# Patient Record
Sex: FEMALE | Race: BLACK OR AFRICAN AMERICAN | Employment: FULL TIME | URBAN - METROPOLITAN AREA
[De-identification: names, ages, dates, MRNs, and addresses within clinical notes are randomized per-mention and may not be internally consistent; named-entity substitution may affect disease eponyms.]

---

## 2017-08-13 ENCOUNTER — HOSPITAL ENCOUNTER (EMERGENCY)
Facility: CLINIC | Age: 32
Discharge: HOME | End: 2017-08-13
Attending: EMERGENCY MEDICINE | Admitting: EMERGENCY MEDICINE
Payer: COMMERCIAL

## 2017-08-13 VITALS
HEIGHT: 63 IN | TEMPERATURE: 98 F | RESPIRATION RATE: 16 BRPM | BODY MASS INDEX: 30.12 KG/M2 | WEIGHT: 170 LBS | OXYGEN SATURATION: 98 % | SYSTOLIC BLOOD PRESSURE: 137 MMHG | DIASTOLIC BLOOD PRESSURE: 82 MMHG

## 2017-08-13 DIAGNOSIS — Z20.2 EXPOSURE TO SYPHILIS: ICD-10-CM

## 2017-08-13 PROCEDURE — 96372 THER/PROPH/DIAG INJ SC/IM: CPT

## 2017-08-13 PROCEDURE — 25000128 H RX IP 250 OP 636: Performed by: EMERGENCY MEDICINE

## 2017-08-13 PROCEDURE — 99284 EMERGENCY DEPT VISIT MOD MDM: CPT | Mod: 25

## 2017-08-13 RX ADMIN — PENICILLIN G BENZATHINE 2.4 MILLION UNITS: 2400000 INJECTION, SUSPENSION INTRAMUSCULAR at 16:11

## 2017-08-13 NOTE — ED AVS SNAPSHOT
Emergency Department    [address]    [place] [address]    Phone:  [phone]    Fax:  [phone]                                       Estela Ye   MRN: [id number]    Department:   Emergency Department   Date of Visit:  8/13/2017           Patient Information     Date Of Birth          1985        Your diagnoses for this visit were:     Exposure to syphilis        You were seen by Archie Mott MD.      Follow-up Information     Follow up with [facility] In 1 week.    Why:  As needed    Contact information:    [facility], [address]  [address] [place] [address]          Discharge Instructions       We have treated you for syphilis, due to your history of being exposed.    We have not tested  You for other  Sexually transmitted disease. If you develop a fever, or vaginal discharge or new sympotms pleae follow up with your obgyn or return to the ER.      Discharge References/Attachments     STD, IF YOU THINK YOU HAVE (ENGLISH)    SEXUALLY TRANSMITTED DISEASES (STDS), WHAT ARE (ENGLISH)      24 Hour Appointment Hotline       To [facility] an appointment [facility], call [phone] ([phone]). If you don't have a family doctor or clinic, we will help you find one. [facility] clinics are conveniently located to serve the needs of you and your family.             Review of your medicines      Notice     You have not been prescribed any medications.            Orders Needing Specimen Collection     None      Pending Results     No orders found from 8/11/2017 to 8/14/2017.            Pending Culture Results     No orders found from 8/11/2017 to 8/14/2017.            Pending Results Instructions     If you had any lab results that were not finalized at the time of your Discharge, you can call the ED Lab Result RN at [phone]. You will be contacted by this team for any positive Lab results or changes in treatment. The nurses  are available 7 days a week from 10A to 6:30P.  You can leave a message 24 hours per day and they will return your call.        Test Results From Your Hospital Stay               Clinical Quality Measure: Blood Pressure Screening     Your blood pressure was checked while you were in the emergency department today. The last reading we obtained was  BP: 137/82 . Please read the guidelines below about what these numbers mean and what you should do about them.  If your systolic blood pressure (the top number) is less than 120 and your diastolic blood pressure (the bottom number) is less than 80, then your blood pressure is normal. There is nothing more that you need to do about it.  If your systolic blood pressure (the top number) is 120-139 or your diastolic blood pressure (the bottom number) is 80-89, your blood pressure may be higher than it should be. You should have your blood pressure rechecked within a year by a primary care provider.  If your systolic blood pressure (the top number) is 140 or greater or your diastolic blood pressure (the bottom number) is 90 or greater, you may have high blood pressure. High blood pressure is treatable, but if left untreated over time it can put you at risk for heart attack, stroke, or kidney failure. You should have your blood pressure rechecked by a primary care provider within the next 4 weeks.  If your provider in the emergency department today gave you specific instructions to follow-up with your doctor or provider even sooner than that, you should follow that instruction and not wait for up to 4 weeks for your follow-up visit.        Thank you for choosing [facility]       Thank you for choosing [facility] for your care. Our goal is always to provide you with excellent care. Hearing back from our patients is one way we can continue to improve our services. Please take a few minutes to complete the written survey that you may receive in the mail after you visit with us. Thank  "you!        [employer] Information     divorce360 lets you send messages to your doctor, view your test results, renew your prescriptions, schedule appointments and more. To sign up, go to [email or web address] . Click on \"Log in\" on the left side of the screen, which will take you to the Welcome page. Then click on \"Sign up Now\" on the right side of the page.     You will be asked to enter the access code listed below, as well as some personal information. Please follow the directions to create your username and password.     Your access code is: [email or web address]  Expires: 2017  8:13 PM     Your access code will  in 90 days. If you need help or a new code, please call your [place] clinic or [phone].        Care EveryWhere ID     This is your Care EveryWhere ID. This could be used by other organizations to access your [place] medical records  [id number]        Equal Access to Services     [facility] Hospital[facility]ISIO : Hadii january salaso Somadeline, waaxda luqadaha, qaybta kaalmada adearvin, sadia whitehead . So [facility] [phone].    ATENCIÓN: Si habla español, tiene a pope disposición servicios gratuitos de asistencia lingüística. Llame al [phone].    We comply with applicable federal civil rights laws and [place] laws. We do not discriminate on the basis of race, color, national origin, age, disability sex, sexual orientation or gender identity.            After Visit Summary       This is your record. Keep this with you and show to your community pharmacist(s) and doctor(s) at your next visit.                  "

## 2017-08-13 NOTE — ED PROVIDER NOTES
"  History     Chief Complaint:  Exposure to STD      The history is provided by the patient.      Estela Ye is a 32 year old female who presents with exposure to STD. The patient was called by a romantic partner this morning and told that he tested positive for syphilis. The last time they had intercourse was around 6/2/17. After receiving the call she reports noticing some vaginal discharge, prompting her visit to the emergency department. She has no other medical concerns.    Allergies:  No known drug allergies      Medications:    The patient is not currently taking any prescribed medications.    Past Medical History:    The patient does not have any past pertinent medical history.     Past Surgical History:    History reviewed. No pertinent surgical history.     Family History:    History reviewed. No pertinent family history.      Social History:  Presents with infant   Tobacco use: Not on file  Alcohol use: No  PCP: [facility]    Marital Status:  Single      Review of Systems   Genitourinary: Positive for vaginal discharge.     Physical Exam     Patient Vitals for the past 24 hrs:   BP Temp Temp src Heart Rate Resp SpO2 Height Weight   08/13/17 1511 137/82 98  F (36.7  C) Oral 86 16 98 % 1.6 m (5' 3\") 77.1 kg (170 lb)      Physical Exam   Constitutional: She is oriented to person, place, and time. She appears well-developed.   Cardiovascular: Normal rate.    Pulmonary/Chest: Effort normal.   Musculoskeletal: Normal range of motion.   Neurological: She is alert and oriented to person, place, and time.   Vitals reviewed.      Emergency Department Course   Interventions:  1611: Bicillin 2.4 Million Units Intramuscular    Emergency Department Course:  Past medical records, nursing notes, and vitals reviewed.  1535: I performed an exam of the patient and obtained history, as documented above.      1601: I rechecked the patient. Findings and plan explained to the Patient. Patient " discharged home with instructions regarding supportive care, medications, and reasons to return. The importance of close follow-up was reviewed.    Impression & Plan    Medical Decision Making:  Estela Ye is a 32 year old female presenting with concerns for STD exposure. Patient admits to a sexual partner who had test proven syphilis according to her. The patient has had no symptoms. She was offered testing and treatment. We did discuss gonorrhea and chlamydia. Patient says she has been tested for this recently and it was negative. Patient's goals are to get treated for syphilis. Patient was given a one time treatment of penicillin and discharged home.      Diagnosis:    ICD-10-CM   1. Exposure to syphilis Z20.2       Disposition:  Discharged to home with plan as outlined above.      Kulwant Quispe  8/13/2017    EMERGENCY DEPARTMENT  I, Kulwant Quispe, am serving as a scribe at 3:35 PM on 8/13/2017 to document services personally performed by Archie Mott MD based on my observations and the provider's statements to me.       Archie Mott MD  08/18/17 1104

## 2017-08-13 NOTE — DISCHARGE INSTRUCTIONS
We have treated you for syphilis, due to your history of being exposed.    We have not tested  You for other  Sexually transmitted disease. If you develop a fever, or vaginal discharge or new sympotms pleae follow up with your obgyn or return to the ER.

## 2017-08-13 NOTE — ED AVS SNAPSHOT
Emergency Department    [address] [facility] [address]    Phone:  [phone]    Fax:  [phone]                                       Estela Ye   MRN: [id number]    Department:   Emergency Department   Date of Visit:  8/13/2017           After Visit Summary Signature Page     I have received my discharge instructions, and my questions have been answered. I have discussed any challenges I see with this plan with the nurse or doctor.    ..........................................................................................................................................  Patient/Patient Representative Signature      ..........................................................................................................................................  Patient Representative Print Name and Relationship to Patient    ..................................................               ................................................  Date                                            Time    ..........................................................................................................................................  Reviewed by Signature/Title    ...................................................              ..............................................  Date                                                            Time

## 2018-08-13 ENCOUNTER — HOSPITAL ENCOUNTER (EMERGENCY)
Facility: CLINIC | Age: 33
Discharge: HOME | End: 2018-08-13
Admitting: PHYSICIAN ASSISTANT
Payer: COMMERCIAL

## 2018-08-13 VITALS
HEART RATE: 89 BPM | TEMPERATURE: 98.6 F | SYSTOLIC BLOOD PRESSURE: 132 MMHG | WEIGHT: 175 LBS | BODY MASS INDEX: 31.01 KG/M2 | OXYGEN SATURATION: 100 % | DIASTOLIC BLOOD PRESSURE: 87 MMHG | RESPIRATION RATE: 14 BRPM | HEIGHT: 63 IN

## 2018-08-13 DIAGNOSIS — N89.8 VAGINAL IRRITATION: ICD-10-CM

## 2018-08-13 DIAGNOSIS — N89.8 VAGINAL SORE: ICD-10-CM

## 2018-08-13 LAB
SPECIMEN SOURCE: NORMAL
WET PREP SPEC: NORMAL

## 2018-08-13 PROCEDURE — 87210 SMEAR WET MOUNT SALINE/INK: CPT | Performed by: PHYSICIAN ASSISTANT

## 2018-08-13 PROCEDURE — 87529 HSV DNA AMP PROBE: CPT | Performed by: PHYSICIAN ASSISTANT

## 2018-08-13 PROCEDURE — 87591 N.GONORRHOEAE DNA AMP PROB: CPT | Performed by: PHYSICIAN ASSISTANT

## 2018-08-13 PROCEDURE — 87491 CHLMYD TRACH DNA AMP PROBE: CPT | Performed by: PHYSICIAN ASSISTANT

## 2018-08-13 PROCEDURE — 99283 EMERGENCY DEPT VISIT LOW MDM: CPT

## 2018-08-13 ASSESSMENT — ENCOUNTER SYMPTOMS
FEVER: 0
HEMATURIA: 0
DYSURIA: 0
ABDOMINAL PAIN: 0

## 2018-08-13 NOTE — ED AVS SNAPSHOT
Emergency Department    [address] [facility] [address]    Phone:  [phone]    Fax:  [phone]                                       Estela Ye   MRN: [id number]    Department:   Emergency Department   Date of Visit:  8/13/2018           Patient Information     Date Of Birth          1985        Your diagnoses for this visit were:     Vaginal irritation     Vaginal sore       Follow-up Information     Go to  Emergency Department.    Specialty:  EMERGENCY MEDICINE    Why:  worsening/changing pain, abdominal pain, fevers >101F, new concerns.     Contact information:    [address] [facility] [address] [phone]        Follow up with [facility]. Go in 1 week.    Why:  For recheck     Contact information:    [address] [address] [phone]          Discharge Instructions       Use bacitracin to the lesion for the next few days. You will be called if the herpes test is positive. Follow-up with your primary care provider within the next week for recheck of symptoms.  Return to the ED for worsening/changing pain, abdominal pain, fevers >101F, new concerns.     24 Hour Appointment Hotline       To make an appointment at any [facility], call [phone] ([phone]). If you don't have a family doctor or clinic, we will help you find one. [place] clinics are conveniently located to serve the needs of you and your family.             Review of your medicines      Notice     You have not been prescribed any medications.            Procedures and tests performed during your visit     Chlamydia trachomatis PCR    HSV 1 and 2 DNA by PCR    Neisseria gonorrhoea PCR    Prep for procedure - pelvic exam    Wet prep      Orders Needing Specimen Collection     None      Pending Results     Date and Time Order Name Status Description    8/13/2018 2136 HSV 1 and 2 DNA by PCR In process     8/13/2018 2043  Neisseria gonorrhoea PCR In process     8/13/2018 2043 Chlamydia trachomatis PCR In process             Pending Culture Results     Date and Time Order Name Status Description    8/13/2018 2136 HSV 1 and 2 DNA by PCR In process     8/13/2018 2043 Neisseria gonorrhoea PCR In process     8/13/2018 2043 Chlamydia trachomatis PCR In process             Pending Results Instructions     If you had any lab results that were not finalized at the time of your Discharge, you can call the ED Lab Result RN at [phone]. You will be contacted by this team for any positive Lab results or changes in treatment. The nurses are available 7 days a week from 10A to 6:30P.  You can leave a message 24 hours per day and they will return your call.        Test Results From Your Hospital Stay        8/13/2018  9:35 PM      Component Results     Component    Specimen Description    Vagina    Wet Prep    No Trichomonas seen    Wet Prep    No yeast seen    Wet Prep    No clue cells seen    Wet Prep    No PMNs seen         8/13/2018  9:30 PM         8/13/2018  9:30 PM         8/13/2018  9:40 PM                Clinical Quality Measure: Blood Pressure Screening     Your blood pressure was checked while you were in the emergency department today. The last reading we obtained was  BP: 132/87 . Please read the guidelines below about what these numbers mean and what you should do about them.  If your systolic blood pressure (the top number) is less than 120 and your diastolic blood pressure (the bottom number) is less than 80, then your blood pressure is normal. There is nothing more that you need to do about it.  If your systolic blood pressure (the top number) is 120-139 or your diastolic blood pressure (the bottom number) is 80-89, your blood pressure may be higher than it should be. You should have your blood pressure rechecked within a year by a primary care provider.  If your systolic blood pressure (the top number) is 140 or greater or your  "diastolic blood pressure (the bottom number) is 90 or greater, you may have high blood pressure. High blood pressure is treatable, but if left untreated over time it can put you at risk for heart attack, stroke, or kidney failure. You should have your blood pressure rechecked by a primary care provider within the next 4 weeks.  If your provider in the emergency department today gave you specific instructions to follow-up with your doctor or provider even sooner than that, you should follow that instruction and not wait for up to 4 weeks for your follow-up visit.        Thank you for choosing [facility]       Thank you for choosing [facility] for your care. Our goal is always to provide you with excellent care. Hearing back from our patients is one way we can continue to improve our services. Please take a few minutes to complete the written survey that you may receive in the mail after you visit with us. Thank you!        [employer] Information     mSpot lets you send messages to your doctor, view your test results, renew your prescriptions, schedule appointments and more. To sign up, go to [email or web address] . Click on \"Log in\" on the left side of the screen, which will take you to the Welcome page. Then click on \"Sign up Now\" on the right side of the page.     You will be asked to enter the access code listed below, as well as some personal information. Please follow the directions to create your username and password.     Your access code is: [email or web address]  Expires: 2018 10:07 PM     Your access code will  in 90 days. If you need help or a new code, please call your [place] clinic or [phone].        Care EveryWhere ID     This is your Care EveryWhere ID. This could be used by other organizations to access your [place] medical records  [id number]        Equal Access to Services     YOHAN CANNON AH: sandra Hartman, sadia keller " francisco whitehead ah. So [facility] [phone].    ATENCIÓN: Si habla español, tiene a pope disposición servicios gratuitos de asistencia lingüística. Llame al [phone].    We comply with applicable federal civil rights laws and [place] laws. We do not discriminate on the basis of race, color, national origin, age, disability, sex, sexual orientation, or gender identity.            After Visit Summary       This is your record. Keep this with you and show to your community pharmacist(s) and doctor(s) at your next visit.

## 2018-08-13 NOTE — ED AVS SNAPSHOT
Emergency Department    [address] [facility] [address]    Phone:  [phone]    Fax:  [phone]                                       Estela Ye   MRN: [id number]    Department:   Emergency Department   Date of Visit:  8/13/2018           After Visit Summary Signature Page     I have received my discharge instructions, and my questions have been answered. I have discussed any challenges I see with this plan with the nurse or doctor.    ..........................................................................................................................................  Patient/Patient Representative Signature      ..........................................................................................................................................  Patient Representative Print Name and Relationship to Patient    ..................................................               ................................................  Date                                            Time    ..........................................................................................................................................  Reviewed by Signature/Title    ...................................................              ..............................................  Date                                                            Time

## 2018-08-14 ENCOUNTER — TELEPHONE (OUTPATIENT)
Dept: EMERGENCY MEDICINE | Facility: CLINIC | Age: 33
End: 2018-08-14

## 2018-08-14 DIAGNOSIS — B00.9 HSV (HERPES SIMPLEX VIRUS) INFECTION: ICD-10-CM

## 2018-08-14 LAB
C TRACH DNA SPEC QL NAA+PROBE: NEGATIVE
HSV1 DNA SPEC QL NAA+PROBE: NEGATIVE
HSV2 DNA SPEC QL NAA+PROBE: POSITIVE
N GONORRHOEA DNA SPEC QL NAA+PROBE: NEGATIVE
SPECIMEN SOURCE: ABNORMAL
SPECIMEN SOURCE: NORMAL
SPECIMEN SOURCE: NORMAL

## 2018-08-14 RX ORDER — VALACYCLOVIR HYDROCHLORIDE 1 G/1
1000 TABLET, FILM COATED ORAL 2 TIMES DAILY
Qty: 20 TABLET | Refills: 0 | Status: SHIPPED | OUTPATIENT
Start: 2018-08-14 | End: 2018-08-24

## 2018-08-14 NOTE — TELEPHONE ENCOUNTER
"[facility] Emergency Department Lab result notification [Adult-Female]    [place] ED lab result protocol used  Herpes Simplex Virus Protocol    Reason for call  Notify of lab results, assess symptoms,  review ED providers recommendations/discharge instructions (if necessary) and advise per ED lab result f/u protocol    Lab Result (including Rx patient on, if applicable)  Final result for HSV 1 is [NEGATIVE] and HSV 2 DNA by PCR is [POSITIVE].    [place] ED discharge antiviral medication (if prescribed): None.  If no treatment initiated in the [place] ED, treat per [place] ED Lab Result protocol.    Information table from ED Provider visit on 8/13/18  ED diagnosis Vaginal irritation   ED provider SANTANA Aguilar CNP   Symptoms reported at ED visit (Chief complaint, HPI) Briefly, the patient presented with concern for vaginal irritation following change in soap.  She noted when using a new soap she had onset of irritation immediately to the perineal area. On personal examination she notes yesterday she had a small sore on the left labia. She notes it was a small pimple like area which was mildly tender until she \"picked at it\" at which point he became increasingly sore and open. She denies history of sexually transmitted infections other than gonorrhea as a teenager. She denies new sexual partners in the last year. She denies vaginal itching, vaginal discharge, vaginal bleeding, pelvic or abdominal pain, fever, dysuria, myalgias, arthralgias.      ED providers Impression and Plan (applicable information) My impression is 33-year-old female who presented the ED today for evaluation of vaginal sore following use of new bath soap. Differential considered include HSV, candidiasis, bacterial vaginosis, Syphilis, HIV, CG, chancroid, contact dermatitis, bartholins cyst.  The area of irritation was noted immediately after use of the new bath soap consistent with a contact dermatitis. She notes the area " "was initially only minor sore until she manipulated the area causing open sore.  She also denies any risk sexual transmitted infection. On examination she has a very small open area on the left labia minora that is only mildly tender to palpation. She does have mild erythema surrounding the area.  Wet prep was negative for acute findings. She has no dysuria and denies risk of pregnancy and no indication for urinalysis today. Chlamydia and gonorrhea testing is pending. HSV testing is also pending.  He will apply a topical antibiotic to the wound along with hot packs. No indication for oral antibiotics or any antiviral medications today. She will follow-up with her gynecologist or primary care in 2-3 days for wound check if not resolved.   Significant Medical hx, if applicable None   Coumadin/Warfarin [Yes /No] No   Creatinine Level (mg/dl) N/A   Creatinine clearance (ml/min), if applicable N/A   Pregnant (Yes/No/NA) No   Breastfeeding (Yes/No/NA) No   Allergies NKA   Weight, if applicable N/A      RN Assessment (Patient s current Symptoms), include time called.  [Insert Left message here if message left]  Estela denies history of HSV,  Doing \"allright\" today.  Did review general information including infection control related to HSV.  Multiple questions answered.    RN Recommendations/Instructions per [facility] ED lab result protocol  Patient notified of lab result and treatment recommendations.  Rx for Valtrex sent to [Pharmacy - [facility] in [place], [place]].      Please Contact your PCP clinic or return to the Emergency department if your:    Symptoms return.    Symptoms do not improve after 3 days on antiviral.    Symptoms do not resolve after completing antiviral.    Symptoms worsen or other concerning symptom's.    PCP follow-up Questions asked: YES       Erika Costa RN    [facility] RN  Lung Nodule and ED Lab Results F/U RN  Epic pool (ED late result f/u RN) : [id number]   # [phone]    Copy of " Lab result   Order   HSV 1 and 2 DNA by PCR [[id number]] (Order [id number])   Exam Information   Exam Date Exam Time Accession # Results    8/13/18  9:20 PM [id number]    Component Results   Component Value Flag Ref Range Units Status Collected Lab   HSV Specimen Type Labia    Final 08/13/2018  9:20 PM [id number]   Comment:   Left   HSV Type 1 PCR Negative  NEG^Negative  Final 08/13/2018  9:20 PM 75   HSV Type 2 PCR Positive (A) NEG^Negative  Final 08/13/2018  9:20 PM

## 2018-08-14 NOTE — ED PROVIDER NOTES
"  History     Chief Complaint:  Vaginal irritation    HPI   Estela Ye is a 33 year old female who presents with concern for vaginal irritation. The patient reports that she has been using a new soap in the shower recently, and that she forgot to rinse the soap off of her pubic area. Yesterday, she noticed a \"blister\" along with some irritation. Due to the persistence of her symptoms, she decided to present to the ED. She denies every alexei an STD besides Gonorrhea when she was a teenager. She states that she has only one sexual partner for the past year. She reports sometimes having yeast infections. She denies any odor, itching, vaginal discharge, vaginal bleeding, pelvic or abdominal pain, or any other concerns here in the ER today.    Allergies:  NKDA    Medications:    The patient is currently on no regular medications.    Past Medical History:    The patient denies any significant past medical history.    Past Surgical History:    The patient does not have any pertinent past surgical history.    Family History:    No past pertinent family history.    Social History:  Marital Status:  Single [1]  Negative for tobacco use.  Alc: yes     Review of Systems   Constitutional: Negative for fever.   Gastrointestinal: Negative for abdominal pain.   Genitourinary: Positive for genital sores. Negative for dysuria, hematuria, vaginal bleeding, vaginal discharge and vaginal pain.   All other systems reviewed and are negative.      Physical Exam     Patient Vitals for the past 24 hrs:   BP Temp Temp src Pulse Resp SpO2 Height Weight   08/13/18 2022 132/87 98.6  F (37  C) Oral 89 14 100 % 1.6 m (5' 3\") 79.4 kg (175 lb)       Physical Exam  General: Well appearing, well nourished. Normal mood and affect.  Skin: Good turgor, no rash, no unusual bruising or prominent lesions.  HEENT: Head: Normocephalic, atraumatic, no visible masses.   Eyes: Conjunctiva clear, sclera non-icteric.  Cardiac: Normal rate and regular " rhythm, no murmur or gallop.   Lungs: Clear to auscultation.  Abdomen: Bowel sounds normal, no tenderness, organomegaly, masses, or hernia. No guarding or rebound tenderness.   Musculoskeletal: Normal gait and station.   Neurologic: Oriented x 3.   Psychiatric: Intact recent and remote memory, judgment and insight, normal mood and affect.   Pelvic: Small white wound to the left external labia. Vagina and cervix without lesions or discharge. Uterus and adnexa/parametria nontender without masses.    Emergency Department Course       Laboratory:  Wet prep: All WNL    Chlamydia PCR: PENDING    Gonorrhoea PCR: PENDING    HSV 1 and 2 PCR: PENDING    Emergency Department Course:  Nursing notes and vitals reviewed. (2043) I performed an exam of the patient as documented above.     (2156) I rechecked the patient and discussed the results of her workup thus far.     Findings and plan explained to the Patient. Patient discharged home with instructions regarding supportive care, medications, and reasons to return. The importance of close follow-up was reviewed.    I personally reviewed the laboratory results with the Patient and answered all related questions prior to discharge.    Impression & Plan      Medical Decision Making:  Estela Ye is a 33-year-old female who presented the ED today for evaluation of vaginal irritation and vaginal sore.  Details of the patient's history can be noted in the HPI.  Differential diagnosis here today included HSV, yeast infection, bacterial vaginosis, trichomonas, chlamydia/gonorrhea, amongst others.  Upon my exam, the patient had no abdominal tenderness or other abnormalities.  Pelvic exam was completed. Small white lesion was seen to the left labia.  This lesion was only mildly tender to palpation.  HSV swab was completed.  Prep was also completed and returned without any abnormalities.  Chlamydia/gonorrhea pending.  It iss my suspicion that the patient's symptoms are due to a  contact irritation.  Patient states that she recently started using a new body wash.  She felt a burning sensation immediately after using this.  The lesion in the vaginal region was not painful until the patient picked at it yesterday.  It seems unlikely the patient is having an HSV outbreak as she has never had an outbreak in the past and has no other systemic symptoms.  Additionally, the patient's lesion is only mildly painful, I would expect this area to be causing the patient a great deal of discomfort. Patient will be called if her HSV or STD tests are positive.  I advised her to follow-up with her primary care provider within the next week to discuss the results and recheck her symptoms.  She may apply a small amount of bacitracin to the wound daily if desired. She should return to the ED for worsening symptoms, or new complaints.  All questions were answered by the patient's discharge.  Patient was in agreement with the plan stated above.    Diagnosis:    ICD-10-CM    1. Vaginal irritation N89.8    2. Vaginal sore N89.8      Disposition:  discharged to home    Discharge Medications:  None     Scribe Disclosure:  IDeep, am serving as a scribe on 8/13/2018 at 8:31 PM to personally document services performed by Micaela Jose PA-C found based on my observations and the provider's statements to me.       Deep De La Cruz  8/13/2018    EMERGENCY DEPARTMENT       Micaela Jose PA  08/13/18 6770

## 2018-08-14 NOTE — DISCHARGE INSTRUCTIONS
Use bacitracin to the lesion for the next few days. You will be called if the herpes test is positive. Follow-up with your primary care provider within the next week for recheck of symptoms.  Return to the ED for worsening/changing pain, abdominal pain, fevers >101F, new concerns.

## 2019-12-04 ENCOUNTER — HOSPITAL ENCOUNTER (EMERGENCY)
Facility: CLINIC | Age: 34
Discharge: HOME | End: 2019-12-04
Attending: NURSE PRACTITIONER | Admitting: NURSE PRACTITIONER
Payer: COMMERCIAL

## 2019-12-04 ENCOUNTER — NURSE TRIAGE (OUTPATIENT)
Dept: NURSING | Facility: CLINIC | Age: 34
End: 2019-12-04

## 2019-12-04 VITALS
RESPIRATION RATE: 18 BRPM | HEIGHT: 63 IN | TEMPERATURE: 99 F | SYSTOLIC BLOOD PRESSURE: 138 MMHG | BODY MASS INDEX: 31.5 KG/M2 | HEART RATE: 81 BPM | DIASTOLIC BLOOD PRESSURE: 90 MMHG | OXYGEN SATURATION: 100 %

## 2019-12-04 DIAGNOSIS — A53.9 ACQUIRED SYPHILIS: ICD-10-CM

## 2019-12-04 PROCEDURE — 25000128 H RX IP 250 OP 636: Performed by: NURSE PRACTITIONER

## 2019-12-04 PROCEDURE — 99284 EMERGENCY DEPT VISIT MOD MDM: CPT | Mod: 25

## 2019-12-04 PROCEDURE — 96372 THER/PROPH/DIAG INJ SC/IM: CPT

## 2019-12-04 RX ORDER — HYDROCORTISONE 25 MG/G
OINTMENT TOPICAL
COMMUNITY
Start: 2019-11-29

## 2019-12-04 RX ADMIN — PENICILLIN G BENZATHINE 2.4 MILLION UNITS: 2400000 INJECTION, SUSPENSION INTRAMUSCULAR at 18:40

## 2019-12-04 ASSESSMENT — ENCOUNTER SYMPTOMS
FEVER: 0
HEADACHES: 0

## 2019-12-04 NOTE — ED NOTES
Patient's respirations are even and non-labored. Patient denies CP or SOB.    Patient reports was sent to ED for treatment of STD.

## 2019-12-04 NOTE — ED AVS SNAPSHOT
Emergency Department  [address] [facility] [address]  Phone:  [phone]  Fax:  [phone]                                    Estela Ye   MRN: [id number]    Department:   Emergency Department   Date of Visit:  12/4/2019           After Visit Summary Signature Page    I have received my discharge instructions, and my questions have been answered. I have discussed any challenges I see with this plan with the nurse or doctor.    ..........................................................................................................................................  Patient/Patient Representative Signature      ..........................................................................................................................................  Patient Representative Print Name and Relationship to Patient    ..................................................               ................................................  Date                                   Time    ..........................................................................................................................................  Reviewed by Signature/Title    ...................................................              ..............................................  Date                                               Time          22EPIC Rev 08/18

## 2019-12-04 NOTE — ED TRIAGE NOTES
Pt states was seen in the ED 1 1/2 years ago for STI exposure. Pt states followed up with primary last year and they never said anything about it. Pt reports saw primary Friday and was told her syphilis was reactive. Pt comes to the ED for treatment.

## 2019-12-04 NOTE — ED PROVIDER NOTES
History     Chief Complaint:  Abnormal Labs    HPI   Estela Ye is a 34 year old female who presents with abnormal labs. She reports that she had a positive syphilis test one and a half years ago, was treated with penicillin at [facility], and later followed up with her primary care provider. She stated that her sexual partner was also treated at that time. She reports that she was repeatedly called by the Department of Health at that time one and a half years ago and told to follow up and receive a repeat syphilis test. She reports that she went to her primary care provider 5 days ago where she was told that she was tested positive for syphilis and negative for hepatitis B (see results below) and was told that she had a low vitamin D level. She reports that she received a call that her syphilis test came back positive a few days ago and she was instructed to get antibiotics. She reports that she has had some abdominal pain since receiving this call but denies vaginal discharge, headache, vision changes, hearing changes, or fever. She denies having sexual intercourse recently. She states that she was also encouraged to be tested for HIV.  She notes a normal menstrual cycle 3 weeks ago and that she has not been sexually active since that time.     Pertinent results from primary care provider visit on 2019  Syphilis RPR titer reactive, 1:2. Treponema Ab reactive  Hepatitis B: Nonreactive    18 RPR 1:16 reactive. Treponema Ab positive.  17 RPR 1:32 reactive. Treponema Ab reactive.    Allergies:  No known drug allergies     Medications:    Vitamin D    Past Medical History:    Trichomonas vaginitis  Syphilis    Past Surgical History:     x2    Family History:    HLD  Colon polyps    Social History:  Smoking status: Never  Alcohol use: Socially    Marital Status:  Single     Review of Systems   Constitutional: Negative for fever.   Eyes: Negative for visual disturbance.  "  Genitourinary: Negative for vaginal discharge.   Neurological: Negative for headaches.   All other systems reviewed and are negative.    Physical Exam     Patient Vitals for the past 24 hrs:   BP Temp Temp src Pulse Resp SpO2 Height   12/04/19 1719 (!) 138/90 99  F (37.2  C) Oral 81 18 100 % 1.588 m (5' 2.5\")     Physical Exam  Nursing notes reviewed. Vitals reviewed.  General: Alert. Well kept.  Eyes:  Conjunctiva non-injected, non-icteric.  Neck/Throat: Moist mucous membranes. Normal voice.  Cardiac: Regular rhythm. Normal heart sounds.  Pulmonary: Clear and equal breath sounds bilaterally.   Abdomen: soft and non-tender  Musculoskeletal: Normal gross range of motion of all 4 extremities.   Neurological: Alert and oriented x4.   Skin: Warm and dry. Normal appearance of visualized exposed skin without rashes or petechiae.  Psych: Affect normal. Good eye contact.    Emergency Department Course     Interventions:  1840: penicillin G benzathine (BICILLIN L-A) injection 2.4 Million Units, IM    Emergency Department Course:  Nursing notes and vitals reviewed. (1748) I performed an exam of the patient as documented above.     Medicine administered as documented above.    1919 I rechecked the patient and discussed the results of her workup thus far.     Findings and plan explained to the Patient. Patient discharged home with instructions regarding supportive care and reasons to return. The importance of close follow-up was reviewed.    Impression & Plan    Medical Decision Making:  Estela Ye, a 34 year old female, presents for evaluation following a positive RPR Syphilis titer and antibody positive for syphilis at her primary care clinic and she was instructed to receive penicillin injection. She states that her insurance changed and she is unable to seek follow up. She was encouraged to seek treatment for syphilis and have HIV testing done. The patient denies any symptoms and had these testing completed as part " of an annual physical. She did also have Hepatitis B testing done which was non-reactive. The patient was initially treated with penicillin 2.4 million units on 8/13/2017 after exposure to partner with known syphilis.  I discussed the plan for care with the patient and she is requesting penicillin treatment here. This was completed. She declined any further evaluation and notes that she does have an appointment set up with [facility] for follow up of the HIV testing and then I discussed that she will need to have repeat testing to ensure clearance of her syphilis. The patient was in contact with the department of health upon her initial diagnosis.    Diagnosis:    ICD-10-CM    1. Acquired syphilis A53.9      Disposition:  discharged to home  Link Conteh  12/4/2019    EMERGENCY DEPARTMENT  Scribe Disclosure:  I, Link Conteh, am serving as a scribe on 12/4/2019 at 5:48 PM to personally document services performed by Danisha Booth, NATALIE based on my observations and the provider's statements to me.      Danisha Booth, CNP  12/04/19 8667

## 2019-12-04 NOTE — TELEPHONE ENCOUNTER
Patient calling reporting she tested positive for syphilis at her clinic in [place]. Stating the clinic called and cancelled her appointment for treatment today due to insurance change. Patient denies having symptoms.  Patient is requesting a same day appointment at [facility] for STD testing follow up.    Caller verbalized understanding. Denies further questions.    Antonia Jensen RN  [facility]

## 2020-01-29 ENCOUNTER — HOSPITAL ENCOUNTER (EMERGENCY)
Facility: CLINIC | Age: 35
Discharge: HOME | End: 2020-01-29
Attending: EMERGENCY MEDICINE | Admitting: EMERGENCY MEDICINE
Payer: COMMERCIAL

## 2020-01-29 VITALS
RESPIRATION RATE: 16 BRPM | HEIGHT: 63 IN | OXYGEN SATURATION: 100 % | DIASTOLIC BLOOD PRESSURE: 93 MMHG | BODY MASS INDEX: 31.01 KG/M2 | TEMPERATURE: 97.9 F | WEIGHT: 175 LBS | SYSTOLIC BLOOD PRESSURE: 129 MMHG

## 2020-01-29 DIAGNOSIS — J11.1 INFLUENZA-LIKE ILLNESS: ICD-10-CM

## 2020-01-29 PROCEDURE — 99282 EMERGENCY DEPT VISIT SF MDM: CPT

## 2020-01-29 ASSESSMENT — ENCOUNTER SYMPTOMS
COUGH: 1
SORE THROAT: 0
FEVER: 1
MYALGIAS: 1
ABDOMINAL PAIN: 0

## 2020-01-29 ASSESSMENT — MIFFLIN-ST. JEOR: SCORE: 1462.92

## 2020-01-29 NOTE — DISCHARGE INSTRUCTIONS
Return to the emergency department or seek medical care as instructed if your symptoms fail to improve or significantly worsen.    Take Acetaminophen (aka Tylenol) and/or ibuprofen (aka Motrin/Advil) as needed for symptom/pain relief; use as directed.    Follow-up as indicated on page 1.  Maintain adequate hydration and get plenty of rest.

## 2020-01-29 NOTE — LETTER
January 29, 2020      To Whom It May Concern:      Estela Ye was seen in our Emergency Department today, 01/29/20.  I expect her condition to improve over the next 2 days.  She may return to work/school when improved.    Sincerely,        Ghulam Albrecht, DO

## 2020-01-29 NOTE — ED PROVIDER NOTES
"  History     Chief Complaint:  Cough and myalgias     HPI   Estela Ye is a 34 year old female who presents to the emergency department for evaluation of a cough and myalgias. The patient reports she developed a productive cough 2 days ago accompanied by myalgias and a subjective fever yesterday, prompting her to present to the ED. She denies abdominal pain and sore throat. The patient reports she has not taken Tylenol or ibuprofen prior to arrival. She states she did not receive a flu shot this year. The patient notes she would like a work note for the next few days.     Allergies:  NKDA     Medications:    The patient is currently on no regular medications.       Past Medical History:    Syphilis     Past Surgical History:         Family History:    No past pertinent family history.     Social History:  Presents with son and daughter.  Never smoker.  Positive for alcohol use.    Negative for drug use.   Marital Status:  Single [1]     Review of Systems   Constitutional: Positive for fever (subjective).   HENT: Negative for sore throat.    Respiratory: Positive for cough.    Gastrointestinal: Negative for abdominal pain.   Musculoskeletal: Positive for myalgias.   All other systems reviewed and are negative.      Physical Exam     Patient Vitals for the past 24 hrs:   BP Temp Temp src Heart Rate Resp SpO2 Height Weight   20 0955 (!) 129/93 97.9  F (36.6  C) Oral 90 16 100 % 1.6 m (5' 3\") 79.4 kg (175 lb)      Physical Exam  General: Alert and cooperative with exam. Patient in mild distress. Normal mentation.  Nontoxic appearance  Head:  Scalp is NC/AT  Eyes:  No scleral icterus, PERRL  ENT:  The external nose and ears are normal. The oropharynx is normal and without erythema; mucus membranes are moist. Uvula midline, no evidence of deep space infection.  Neck:  Normal range of motion without rigidity.  CV:  Regular rate and rhythm    No pathologic murmur   Resp:  Breath sounds are clear " bilaterally.  No significant cough on exam    Non-labored, no retractions or accessory muscle use  GI:  Abdomen is soft, no distension, no tenderness. No peritoneal signs  MS:  No lower extremity edema   Skin:  Warm and dry, No rash or lesions noted.  Neuro: Oriented x 3. No gross motor deficits.    Emergency Department Course     Emergency Department Course:  Past medical records, nursing notes, and vitals reviewed.    0955 I performed an exam of the patient as documented above.     Findings and plan explained to the Patient. Patient discharged home with instructions regarding supportive care, medications, and reasons to return. The importance of close follow-up was reviewed. The patient was instructed to take Tylenol and ibuprofen as needed.    Impression & Plan      Medical Decision Making:  Estela Ye is a 34 year old female who presents for evaluation of cough, fever and myalgias.  This is consistent with an influenza like illness.  The patient is out of treatment window for influenza and medications ordered as noted above.  They are at risk for pneumonia but no signs of this are detected on today's visit.  Close followup of primary care physician is indicated as needed, supportive care discussed, and return to the ED for high fevers, increasing productive cough, shortness of breath, or confusion.  There is no signs of serious bacterial infection such as bacteremia, meningitis, UTI/pyelonephritis, strep pharyngitis, etc.      Diagnosis:    ICD-10-CM    1. Influenza-like illness R69      Disposition:  discharged to home    Scribe Disclosure:  Hannah STEVENSON, am serving as a scribe on 1/29/2020 at 9:54 AM to personally document services performed by Ghulam Albrecht DO based on my observations and the provider's statements to me.      Hannah Torre  1/29/2020    EMERGENCY DEPARTMENT       Ghulam Albrecht DO  01/29/20 1024

## 2020-01-29 NOTE — ED AVS SNAPSHOT
Emergency Department  [address] [facility] [address]  Phone:  [phone]  Fax:  [phone]                                    Estela Ye   MRN: [id number]    Department:   Emergency Department   Date of Visit:  1/29/2020           After Visit Summary Signature Page    I have received my discharge instructions, and my questions have been answered. I have discussed any challenges I see with this plan with the nurse or doctor.    ..........................................................................................................................................  Patient/Patient Representative Signature      ..........................................................................................................................................  Patient Representative Print Name and Relationship to Patient    ..................................................               ................................................  Date                                   Time    ..........................................................................................................................................  Reviewed by Signature/Title    ...................................................              ..............................................  Date                                               Time          22EPIC Rev 08/18

## 2020-04-06 ENCOUNTER — VIRTUAL VISIT (OUTPATIENT)
Dept: FAMILY MEDICINE | Facility: OTHER | Age: 35
End: 2020-04-06

## 2020-04-06 NOTE — PROGRESS NOTES
"Date: 2020 13:40:47  Clinician: Dontae Mcdonough  Clinician NPI: [id number]  Patient: Estela Ye  Patient : 1985  Patient Address: [address] [place] [id number]  Patient Phone: [phone]  Visit Protocol: URI  Patient Summary:  Estela is a 34 year old ( : 1985 ) female who initiated a Visit for COVID-19 (Coronavirus) evaluation and screening. When asked the question \"Please sign me up to receive news, health information and promotions from [email or web address].\", Estela responded \"Yes\".    Estela states her symptoms started 1-2 days ago.   Her symptoms consist of a sore throat and a cough.   Symptom details     Cough: Estela coughs a few times an hour and her cough is not more bothersome at night. Phlegm comes into her throat when she coughs. She believes her cough is caused by post-nasal drip. The color of the phlegm is clear.     Sore throat: Estela reports having mild throat pain (1-3 on a 10 point pain scale), has exudate on her tonsils, and can swallow liquids. The lymph nodes in her neck are not enlarged. A rash has not appeared on the skin since the sore throat started.      Estela denies having rhinitis, facial pain or pressure, myalgias, nasal congestion, malaise, ear pain, enlarged lymph nodes, chills, diarrhea, vomiting, nausea, teeth pain, headache, fever, and wheezing. She also denies having recent facial or sinus surgery in the past 60 days. She is not experiencing dyspnea.   Precipitating events  Estela is not sure if she has been exposed to someone with strep throat. She has not recently been exposed to someone with influenza. Estela has not been in close contact with any high risk individuals.   Pertinent COVID-19 (Coronavirus) information  Estela has not traveled internationally or to the areas where COVID-19 (Coronavirus) is widespread, including cruise ship travel in the last 14 days before the start of her symptoms.   Estela has not had a " close contact with a laboratory-confirmed COVID-19 patient within 14 days of symptom onset. She also has not had a close contact with a suspected COVID-19 patient within 14 days of symptom onset.   Estela does not work or volunteer as healthcare worker or a  and does not work or volunteer in a healthcare facility. She does not live with a healthcare worker.   Pertinent medical history  Estela has taken an antibiotic medication in the past month. Antibiotic details as reported by the patient (free text): metronidazole   Estela typically gets a yeast infection when she takes antibiotics. She has not used fluconazole (Diflucan) to treat previous yeast infections.   Estela does not need a return to work/school note.   Weight: 175 lbs   Estela does not smoke or use smokeless tobacco.   She denies pregnancy and denies breastfeeding. She is currently menstruating.   Weight: 175 lbs    MEDICATIONS: No current medications, ALLERGIES: NKDA  Clinician Response:  Dear Estela,   Based on the information you have provided, you do have symptoms that are consistent with Coronavirus (COVID-19).  The coronavirus causes mild to severe respiratory illness with the most common symptoms including fever, cough and difficulty breathing. Unfortunately, many viruses cause similar symptoms and it can be difficult to distinguish between viruses, especially in mild cases, so we are presuming that anyone with cough or fever has coronavirus at this time.  Coronavirus/COVID-19 has reached the point of community spread in [place], meaning that we are finding the virus in people with no known exposure risk for alexei the virus. Given the increasing commonness of coronavirus in the community we are no longer testing patients who are not critically ill.  If you are a health care worker, you should refer to your employee health office for instructions about testing and returning to work.  For everyone else who  has cough or fever, you should assume you are infected with coronavirus. Since you will not be tested but have symptoms that may be consistent with coronavirus, the CDC recommends you stay in self-isolation until these three things have happened:    You have had no fever for at least 72 hours (that is three full days of no fever without the use of medicine that reduces fevers)    AND   Other symptoms have improved (for example, when your cough or shortness of breath have improved)   AND   At least 7 days have passed since your symptoms first appeared.   How to Isolate:   Isolate yourself at home.  Do Not allow any visitors  Do Not go to work or school  Do Not go to [employer],  centers, shopping, or other public places.  Do Not shake hands.  Avoid close contact with others (hugging, kissing).   Protect Others:   Cover Your Mouth and Nose with a mask, disposable tissue or wash cloth to avoid spreading germs to others.  Wash your hands and face frequently with soap and water.   We know it can be scary to hear that you might have COVID-19. Our team can help track your symptoms and make sure you are doing ok over the next two weeks using a program called [employer] to keep in touch. When you receive an email from [employer], please consider enrolling in our monitoring program. There is no cost to you for monitoring. Here is a URL where you can learn more: [email or web address]  Managing Symptoms:   At this time, we primarily recommend Tylenol (Acetaminophen) for fever or pain. If you have liver or kidney problems, contact your primary care provider for instructions on use of tylenol. Adults can take 650 mg (two 325 mg pills) by mouth every 4-6 hours as needed OR 1,000 mg (two 500 mg pills) every 8 hours as needed. MAXIMUM DAILY DOSE: 3,000mg. For children, refer to dosing on bottle based on age or weight.   If you develop significant shortness of breath that prevents you from doing normal  activities, please call 911 or proceed to the nearest emergency room and alert them immediately that you have been in self-isolation for possible coronavirus.  If you have a higher risk medical condition such as cancer, heart failure, end stage renal disease on dialysis or have a transplant, please reach out to your specialist's clinic to advise them of your [facility] visit should you not improve within the next two days.   For more information about COVID19 and options for caring for yourself at home, please visit the CDC website at https://www.cdc.gov/coronavirus/2019-ncov/about/steps-when-sick.htmlFor more options for care at [facility], please visit our website at [email or web address]    Diagnosis: Acute upper respiratory infection, unspecified  Diagnosis ICD: J06.9  Prescription: benzonatate (Tessalon Perles) 100 mg oral capsule 21 capsule, 7 days supply. Take 1 capsule by mouth 3 times per day for 7 days as needed. Refills: 0, Refill as needed: no, Allow substitutions: yes  Pharmacy: [facility] - [phone] - [address] [place] [address]

## 2021-08-26 ENCOUNTER — HOSPITAL ENCOUNTER (EMERGENCY)
Facility: CLINIC | Age: 36
Discharge: HOME | End: 2021-08-26
Attending: NURSE PRACTITIONER | Admitting: NURSE PRACTITIONER
Payer: COMMERCIAL

## 2021-08-26 VITALS
TEMPERATURE: 98.7 F | WEIGHT: 170 LBS | HEART RATE: 76 BPM | BODY MASS INDEX: 30.12 KG/M2 | DIASTOLIC BLOOD PRESSURE: 92 MMHG | RESPIRATION RATE: 16 BRPM | HEIGHT: 63 IN | SYSTOLIC BLOOD PRESSURE: 146 MMHG | OXYGEN SATURATION: 100 %

## 2021-08-26 DIAGNOSIS — H60.92 OTITIS EXTERNA, LEFT: ICD-10-CM

## 2021-08-26 PROCEDURE — 250N000013 HC RX MED GY IP 250 OP 250 PS 637: Performed by: NURSE PRACTITIONER

## 2021-08-26 PROCEDURE — 99283 EMERGENCY DEPT VISIT LOW MDM: CPT

## 2021-08-26 RX ORDER — IBUPROFEN 600 MG/1
600 TABLET, FILM COATED ORAL ONCE
Status: COMPLETED | OUTPATIENT
Start: 2021-08-26 | End: 2021-08-26

## 2021-08-26 RX ORDER — NEOMYCIN SULFATE, POLYMYXIN B SULFATE, HYDROCORTISONE 3.5; 10000; 1 MG/ML; [USP'U]/ML; MG/ML
3 SOLUTION/ DROPS AURICULAR (OTIC) 4 TIMES DAILY
Qty: 10 ML | Refills: 0 | Status: SHIPPED | OUTPATIENT
Start: 2021-08-26 | End: 2021-09-05

## 2021-08-26 RX ADMIN — IBUPROFEN 600 MG: 600 TABLET ORAL at 18:30

## 2021-08-26 ASSESSMENT — ENCOUNTER SYMPTOMS
TROUBLE SWALLOWING: 0
CHILLS: 0
FEVER: 0

## 2021-08-26 ASSESSMENT — MIFFLIN-ST. JEOR: SCORE: 1430.24

## 2021-08-26 NOTE — ED PROVIDER NOTES
"  History     Chief Complaint:  Otalgia      HPI   Estela Ye is a 36 year old female who presents with left ear pain. She reports that her pain developed last . She reports pain while chewing but denies pain with swallowing. No fevers.      Review of Systems   Constitutional: Negative for chills and fever.   HENT: Positive for ear pain. Negative for trouble swallowing.    All other systems reviewed and are negative.      Allergies:  The patient has no known allergies.    Medications:   The patient is not currently taking any prescribed medications.      Past Medical History:    Syphilis  Hepatitis B infection     Past Surgical History:     section x2    Family History:    Mother: hyperlipidemia, colon cancer    Social History:  Patient was unaccompanied      Physical Exam     Patient Vitals for the past 24 hrs:   BP Temp Temp src Pulse Resp SpO2 Height Weight   21 1816 (!) 146/92 98.7  F (37.1  C) Oral 76 16 100 % 1.6 m (5' 3\") 77.1 kg (170 lb)       Physical Exam  General: Alert, Mild  discomfort, well kept   HENT:  Normal voice, No lymphadenopathy, right TM within normal limits.  Left TM within normal limits however left external canal is edematous and tender.  There is pain with movement of pinna and tragus.  No discharge.  Eyes:  The pupils are equal, round, and reactive to light, Conjunctiva normal, No scleral icterus   Neck:  Normal range of motion  CV:  Normal Pulses  Resp:  Non-labored, No cough  MS:  Normal muscular tone, moves all extremities  Skin:  No rash or acute skin lesions noted  Neuro:  Speech is normal and fluent  Psych: Awake. Alert.  Normal affect.  Appropriate interactions. Good eye contact      Emergency Department Course     Emergency Department Course:    Reviewed:  I reviewed nursing notes, vitals, past history and care everywhere    Assessments:   I obtained history and examined the patient as noted above.     Interventions:   Advil, 600 mg, oral "     Disposition:  The patient was discharged to home.    Impression & Plan        Medical Decision Making:  Estela Ye is a 36 year old female who presents for evaluation of ear pain.  Exam is consistent with otitis externa and no evidence of malignant otitis or mastoiditis.  The patient will be started on Cortisporin drops and instructed to follow up with their primary care provider in 2-3 days if no improvement. Advised for immediate return to UR/ER if he develops high fever (not controled with medications), increased pain, or for other concerns          Diagnosis:    ICD-10-CM    1. Otitis externa, left  H60.92        Discharge Medications:  New Prescriptions    NEOMYCIN-POLYMYXIN-HYDROCORTISONE (CORTISPORIN) [id number] OTIC SOLUTION    Place 3 drops in ear(s) 4 times daily for 10 days         Scribe Disclosure:  IChristopher, am serving as a scribe at 6:16 PM on 8/26/2021 to document services personally performed by Dontae Church APRN based on my observations and the provider's statements to me.      Dontae Church APRN CNP  08/26/21 [id number]

## 2021-08-26 NOTE — DISCHARGE INSTRUCTIONS
Take ibuprofen 600 mg every 6 hours or naproxen 2 tablets twice daily as needed for discomfort.  You may also try warm compresses.  Easiest way to do this is to hit a washcloth wet and put it in the microwave.  When it comes on the microwave it will be very very hot place it in a Ziploc bag and wrapped it with another washcloth.  Use antibiotic drops as directed.